# Patient Record
Sex: FEMALE | Race: OTHER | HISPANIC OR LATINO | URBAN - METROPOLITAN AREA
[De-identification: names, ages, dates, MRNs, and addresses within clinical notes are randomized per-mention and may not be internally consistent; named-entity substitution may affect disease eponyms.]

---

## 2022-11-19 ENCOUNTER — EMERGENCY (EMERGENCY)
Facility: HOSPITAL | Age: 22
LOS: 1 days | Discharge: HOME | End: 2022-11-19
Attending: EMERGENCY MEDICINE
Payer: COMMERCIAL

## 2022-11-19 VITALS — TEMPERATURE: 98 F | HEART RATE: 105 BPM | OXYGEN SATURATION: 98 % | RESPIRATION RATE: 18 BRPM

## 2022-11-19 PROCEDURE — 99053 MED SERV 10PM-8AM 24 HR FAC: CPT

## 2022-11-19 PROCEDURE — 71046 X-RAY EXAM CHEST 2 VIEWS: CPT | Mod: 26

## 2022-11-19 PROCEDURE — 99284 EMERGENCY DEPT VISIT MOD MDM: CPT

## 2022-11-19 RX ORDER — LIDOCAINE 4 G/100G
1 CREAM TOPICAL ONCE
Refills: 0 | Status: COMPLETED | OUTPATIENT
Start: 2022-11-19 | End: 2022-11-19

## 2022-11-19 RX ORDER — METHOCARBAMOL 500 MG/1
750 TABLET, FILM COATED ORAL ONCE
Refills: 0 | Status: COMPLETED | OUTPATIENT
Start: 2022-11-19 | End: 2022-11-19

## 2022-11-19 RX ORDER — IBUPROFEN 200 MG
600 TABLET ORAL ONCE
Refills: 0 | Status: COMPLETED | OUTPATIENT
Start: 2022-11-19 | End: 2022-11-19

## 2022-11-19 RX ORDER — ACETAMINOPHEN 500 MG
650 TABLET ORAL ONCE
Refills: 0 | Status: COMPLETED | OUTPATIENT
Start: 2022-11-19 | End: 2022-11-19
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
## 2022-11-20 VITALS — HEART RATE: 82 BPM

## 2022-11-20 PROCEDURE — 93005 ELECTROCARDIOGRAM TRACING: CPT

## 2022-11-20 PROCEDURE — 93010 ELECTROCARDIOGRAM REPORT: CPT

## 2022-11-20 PROCEDURE — 71046 X-RAY EXAM CHEST 2 VIEWS: CPT

## 2022-11-20 PROCEDURE — 99283 EMERGENCY DEPT VISIT LOW MDM: CPT

## 2022-11-20 RX ORDER — IBUPROFEN 200 MG
1 TABLET ORAL
Qty: 20 | Refills: 0
Start: 2022-11-20 | End: 2022-11-24

## 2022-11-20 RX ORDER — METHOCARBAMOL 500 MG/1
2 TABLET, FILM COATED ORAL
Qty: 18 | Refills: 0
Start: 2022-11-20 | End: 2022-11-22

## 2022-11-20 RX ADMIN — METHOCARBAMOL 750 MILLIGRAM(S): 500 TABLET, FILM COATED ORAL at 00:34

## 2022-11-20 RX ADMIN — Medication 650 MILLIGRAM(S): at 00:33

## 2022-11-20 RX ADMIN — LIDOCAINE 1 PATCH: 4 CREAM TOPICAL at 00:33

## 2022-11-20 RX ADMIN — Medication 600 MILLIGRAM(S): at 00:34
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
## 2022-11-20 NOTE — ED PROVIDER NOTE - NS ED MD DISPO DISCHARGE CCDA
Notified patient on refill. Patient also states she needs refills on Atenolol, Losartan, and Atorvastatin. Will work on getting those filled later today.    Patient/Caregiver provided printed discharge information.
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
## 2022-11-20 NOTE — ED PROVIDER NOTE - NS ED ROS FT
Gen: denies fever, chills  Skin: denies rashes  HEENT: denies visual changes, ear pain, nasal congestion, throat pain  Respiratory: denies SAMSON, SOB  Cardiovascular: +left sided chest discomfort. denies chest pain, palpitations  GI: denies abdominal pain, n/v/d  : denies dysuria, frequency, urgency, bowel/bladder incontinence  MSK: +back pain. denies joint swelling/pain, neck pain  Neuro: denies headache, dizziness, LOC, weakness, numbness

## 2022-11-20 NOTE — ED PROVIDER NOTE - CARE PROVIDER_API CALL
Jose L Garcia  Orthopaedic Surgery  [address] [place] [address]  Phone: [phone]  Fax: [phone]  Follow Up Time:

## 2022-11-20 NOTE — ED PROVIDER NOTE - PATIENT PORTAL LINK FT
You can access the FollowMyHealth Patient Portal offered by [facility] by registering at the following website: [email or web address]. By joining [employer]’s FollowMyHealth portal, you will also be able to view your health information using other applications (apps) compatible with our system.
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
## 2025-01-03 NOTE — ED ADULT TRIAGE NOTE - AS TEMP SITE
Pt arrives on wheelchair w/ c/o of weakness and heart palpitations that's been going for about 4 days. Pt states heart feels like its racing when walking short distances. Denies cardiac issues or SOB. Has a hx of low potassium. A&O x 4.    oral